# Patient Record
Sex: MALE | Race: WHITE | NOT HISPANIC OR LATINO | Employment: OTHER | URBAN - METROPOLITAN AREA
[De-identification: names, ages, dates, MRNs, and addresses within clinical notes are randomized per-mention and may not be internally consistent; named-entity substitution may affect disease eponyms.]

---

## 2023-12-20 DIAGNOSIS — G45.9 TIA (TRANSIENT ISCHEMIC ATTACK): Primary | ICD-10-CM

## 2024-03-06 ENCOUNTER — OFFICE VISIT (OUTPATIENT)
Dept: PRIMARY CARE CLINIC | Facility: CLINIC | Age: 79
End: 2024-03-06
Payer: MEDICARE

## 2024-03-06 VITALS
HEIGHT: 60 IN | HEART RATE: 71 BPM | TEMPERATURE: 98 F | BODY MASS INDEX: 29.35 KG/M2 | WEIGHT: 149.5 LBS | OXYGEN SATURATION: 99 % | DIASTOLIC BLOOD PRESSURE: 64 MMHG | SYSTOLIC BLOOD PRESSURE: 147 MMHG

## 2024-03-06 DIAGNOSIS — R46.0 POOR PERSONAL HYGIENE: ICD-10-CM

## 2024-03-06 DIAGNOSIS — R30.0 DYSURIA: Primary | ICD-10-CM

## 2024-03-06 DIAGNOSIS — F02.B11 MODERATE LATE ONSET ALZHEIMER'S DEMENTIA WITH AGITATION: ICD-10-CM

## 2024-03-06 DIAGNOSIS — R15.9 ENCOPRESIS: ICD-10-CM

## 2024-03-06 DIAGNOSIS — Z97.3 WEARS GLASSES: ICD-10-CM

## 2024-03-06 DIAGNOSIS — I10 PRIMARY HYPERTENSION: ICD-10-CM

## 2024-03-06 DIAGNOSIS — G30.1 MODERATE LATE ONSET ALZHEIMER'S DEMENTIA WITH AGITATION: ICD-10-CM

## 2024-03-06 DIAGNOSIS — S40.021A CONTUSION OF BOTH UPPER EXTREMITIES, INITIAL ENCOUNTER: ICD-10-CM

## 2024-03-06 DIAGNOSIS — H91.10 PRESBYCUSIS, UNSPECIFIED LATERALITY: ICD-10-CM

## 2024-03-06 DIAGNOSIS — S40.022A CONTUSION OF BOTH UPPER EXTREMITIES, INITIAL ENCOUNTER: ICD-10-CM

## 2024-03-06 LAB
BILIRUB SERPL-MCNC: NEGATIVE MG/DL
BLOOD URINE, POC: NORMAL
COLOR, POC UA: YELLOW
GLUCOSE UR QL STRIP: NEGATIVE
KETONES UR QL STRIP: NEGATIVE
LEUKOCYTE ESTERASE URINE, POC: NORMAL
NITRITE, POC UA: NEGATIVE
PH, POC UA: 5.5
PROTEIN, POC: NEGATIVE
SPECIFIC GRAVITY, POC UA: 1 .2
UROBILINOGEN, POC UA: NORMAL

## 2024-03-06 PROCEDURE — 81001 URINALYSIS AUTO W/SCOPE: CPT | Mod: S$GLB,,, | Performed by: HOSPITALIST

## 2024-03-06 PROCEDURE — 3077F SYST BP >= 140 MM HG: CPT | Mod: CPTII,S$GLB,, | Performed by: HOSPITALIST

## 2024-03-06 PROCEDURE — 99999 PR PBB SHADOW E&M-EST. PATIENT-LVL V: CPT | Mod: PBBFAC,,, | Performed by: HOSPITALIST

## 2024-03-06 PROCEDURE — 99203 OFFICE O/P NEW LOW 30 MIN: CPT | Mod: S$GLB,,, | Performed by: HOSPITALIST

## 2024-03-06 PROCEDURE — 1159F MED LIST DOCD IN RCRD: CPT | Mod: CPTII,S$GLB,, | Performed by: HOSPITALIST

## 2024-03-06 PROCEDURE — 1101F PT FALLS ASSESS-DOCD LE1/YR: CPT | Mod: CPTII,S$GLB,, | Performed by: HOSPITALIST

## 2024-03-06 PROCEDURE — 87205 SMEAR GRAM STAIN: CPT | Performed by: HOSPITALIST

## 2024-03-06 PROCEDURE — 1126F AMNT PAIN NOTED NONE PRSNT: CPT | Mod: CPTII,S$GLB,, | Performed by: HOSPITALIST

## 2024-03-06 PROCEDURE — 3288F FALL RISK ASSESSMENT DOCD: CPT | Mod: CPTII,S$GLB,, | Performed by: HOSPITALIST

## 2024-03-06 PROCEDURE — 3078F DIAST BP <80 MM HG: CPT | Mod: CPTII,S$GLB,, | Performed by: HOSPITALIST

## 2024-03-06 RX ORDER — OLMESARTAN MEDOXOMIL 20 MG/1
20 TABLET ORAL DAILY
Qty: 90 TABLET | Refills: 3 | Status: SHIPPED | OUTPATIENT
Start: 2024-03-06 | End: 2025-03-06

## 2024-03-06 RX ORDER — TAMSULOSIN HYDROCHLORIDE 0.4 MG/1
0.4 CAPSULE ORAL DAILY
Qty: 30 CAPSULE | Refills: 11 | Status: SHIPPED | OUTPATIENT
Start: 2024-03-06 | End: 2025-03-06

## 2024-03-06 RX ORDER — LORAZEPAM 0.5 MG/1
0.5 TABLET ORAL EVERY 12 HOURS PRN
Qty: 30 TABLET | Refills: 0 | Status: SHIPPED | OUTPATIENT
Start: 2024-03-06 | End: 2024-03-20
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
## 2024-03-07 LAB
GRAM STN SPEC: NORMAL
GRAM STN SPEC: NORMAL

## 2024-03-10 PROBLEM — G30.1 MODERATE LATE ONSET ALZHEIMER'S DEMENTIA WITH AGITATION: Chronic | Status: ACTIVE | Noted: 2023-07-20

## 2024-03-10 PROBLEM — R15.9 ENCOPRESIS: Status: ACTIVE | Noted: 2024-03-10

## 2024-03-10 PROBLEM — S40.021A BRUISE OF BOTH ARMS: Status: ACTIVE | Noted: 2024-01-12

## 2024-03-10 PROBLEM — H91.10 PRESBYCUSIS: Status: ACTIVE | Noted: 2024-03-10

## 2024-03-10 PROBLEM — F03.90 DEMENTIA: Status: ACTIVE | Noted: 2023-07-20

## 2024-03-10 PROBLEM — R46.0 POOR PERSONAL HYGIENE: Status: ACTIVE | Noted: 2024-03-10

## 2024-03-10 PROBLEM — F02.B11 MODERATE LATE ONSET ALZHEIMER'S DEMENTIA WITH AGITATION: Chronic | Status: ACTIVE | Noted: 2023-07-20

## 2024-03-10 PROBLEM — I10 HYPERTENSION: Status: ACTIVE | Noted: 2024-01-16

## 2024-03-10 PROBLEM — Z97.3 WEARS GLASSES: Status: ACTIVE | Noted: 2024-03-10

## 2024-03-10 PROBLEM — S40.022A BRUISE OF BOTH ARMS: Status: ACTIVE | Noted: 2024-01-12

## 2024-03-10 PROBLEM — R30.0 DYSURIA: Status: ACTIVE | Noted: 2024-03-10

## 2024-03-10 NOTE — ASSESSMENT & PLAN NOTE
Mitzy notes significant hearing loss, likely representing presbycusis.  Referrals to audiology and ENT placed.
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
## 2024-03-10 NOTE — ASSESSMENT & PLAN NOTE
Patient having involuntary episodes of fecal incontinence, likely due to advanced cognitive dysfunction.  He is aware when it happens and tries to cover up the evidenced by flushing his underwear down the toilet, which jeopardizes his plumbing.  They are unfortunately is little that can be done for this other than being more prepared by wearing disposable briefs, and disposing of them properly.

## 2024-03-10 NOTE — ASSESSMENT & PLAN NOTE
Urinary frequency, urgency, and nocturia seem most consistent with BPH, but we will get urine studies to rule out infection.  Flomax 0.4 mg prescribed.

## 2024-03-10 NOTE — ASSESSMENT & PLAN NOTE
Mitzy is concerned about patient possibly falling when they are not looking, but his gait, strength, and endurance appears normal.  Based on the location of the bruises on his forearms, it is likely he is inadvertently hitting things as he walks past them.

## 2024-03-10 NOTE — ASSESSMENT & PLAN NOTE
He wears glasses but has not had an eye exam in many years as far as we can tell.  Optometry referral for routine eye exam placed.

## 2024-03-10 NOTE — ASSESSMENT & PLAN NOTE
Patient requires additional supervision and prompting to maintain personal hygiene, which is being further facilitated by family now staying with him at home.

## 2024-03-18 ENCOUNTER — PATIENT MESSAGE (OUTPATIENT)
Dept: PRIMARY CARE CLINIC | Facility: CLINIC | Age: 79
End: 2024-03-18
Payer: MEDICARE

## 2024-03-18 ENCOUNTER — TELEPHONE (OUTPATIENT)
Dept: PRIMARY CARE CLINIC | Facility: CLINIC | Age: 79
End: 2024-03-18
Payer: MEDICARE

## 2024-03-18 DIAGNOSIS — G30.1 MODERATE LATE ONSET ALZHEIMER'S DEMENTIA WITH AGITATION: Primary | Chronic | ICD-10-CM

## 2024-03-18 DIAGNOSIS — F02.B11 MODERATE LATE ONSET ALZHEIMER'S DEMENTIA WITH AGITATION: Primary | Chronic | ICD-10-CM
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
## 2024-03-18 NOTE — LETTER
March 27, 2024    Howard Rossi  [address] [facility] [address]             [facility]  [address]  [address]  [facility] [address]  Phone: [phone]  Fax: [phone] To Whom it May Concern,    This letter is intended to serve as a standing physician order placing a partial restriction on on this patient's driving privileges.  On the basis of his moderate cognitive impairment, it is my professional recommendation that for this patient's safety he should only be allowed to drive with direct supervision by a family member or other designated caretaker, pending psychometric evaluation of the extent of his cognitive impairment.  Please feel free to contact me for any further questions or concerns regarding this matter.    Sincerely,        Rosendo Brown MD  [facility]

## 2024-03-19 ENCOUNTER — OFFICE VISIT (OUTPATIENT)
Dept: PRIMARY CARE CLINIC | Facility: CLINIC | Age: 79
End: 2024-03-19
Payer: MEDICARE

## 2024-03-19 DIAGNOSIS — G30.1 MODERATE LATE ONSET ALZHEIMER'S DEMENTIA WITH AGITATION: Chronic | ICD-10-CM

## 2024-03-19 DIAGNOSIS — R44.3 HALLUCINATIONS: Primary | ICD-10-CM

## 2024-03-19 DIAGNOSIS — R30.0 DYSURIA: ICD-10-CM

## 2024-03-19 DIAGNOSIS — R46.0 POOR PERSONAL HYGIENE: ICD-10-CM

## 2024-03-19 DIAGNOSIS — F02.B11 MODERATE LATE ONSET ALZHEIMER'S DEMENTIA WITH AGITATION: Chronic | ICD-10-CM

## 2024-03-19 PROCEDURE — 99499 UNLISTED E&M SERVICE: CPT | Mod: 95,,, | Performed by: HOSPITALIST
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
## 2024-03-20 PROBLEM — R44.3 HALLUCINATIONS: Status: ACTIVE | Noted: 2024-03-20

## 2024-03-20 NOTE — ASSESSMENT & PLAN NOTE
Discussed with Mitzy that the Ativan was not intended to be used on a daily basis or even more than once a day, and is likely why he is now having more frequent and vivid hallucinations.  I advised stopping this medication.  I agree with her decision to hide his keys to prevent him from driving presently.  That issue can be readdressed pending how his mental status changes after stopping the Ativan.

## 2024-03-20 NOTE — ASSESSMENT & PLAN NOTE
LUTS significantly improved after initiation of Flomax.  Urine studies done at prior clinic visit negative for signs of infection or other irritative causes.
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
## 2024-03-20 NOTE — ASSESSMENT & PLAN NOTE
Unable to completely address this prior to termination of visit; caregivers will need to develop a system of reminding him of hygiene tasks without seeming confrontational.

## 2024-03-21 RX ORDER — RISPERIDONE 0.5 MG/1
0.5 TABLET ORAL NIGHTLY
Qty: 30 TABLET | Refills: 11 | Status: SHIPPED | OUTPATIENT
Start: 2024-03-21 | End: 2024-04-30
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
## 2024-04-03 ENCOUNTER — OFFICE VISIT (OUTPATIENT)
Dept: NEUROLOGY | Facility: CLINIC | Age: 79
End: 2024-04-03
Payer: MEDICARE

## 2024-04-03 DIAGNOSIS — F02.B11 MODERATE LATE ONSET ALZHEIMER'S DEMENTIA WITH AGITATION: Primary | Chronic | ICD-10-CM

## 2024-04-03 DIAGNOSIS — G30.1 MODERATE LATE ONSET ALZHEIMER'S DEMENTIA WITH AGITATION: Primary | Chronic | ICD-10-CM

## 2024-04-03 PROCEDURE — 99499 UNLISTED E&M SERVICE: CPT | Mod: 95,,, | Performed by: CLINICAL NEUROPSYCHOLOGIST

## 2024-04-03 PROCEDURE — 96116 NUBHVL XM PHYS/QHP 1ST HR: CPT | Mod: 95,,, | Performed by: CLINICAL NEUROPSYCHOLOGIST

## 2024-04-04 ENCOUNTER — OFFICE VISIT (OUTPATIENT)
Facility: CLINIC | Age: 79
End: 2024-04-04
Payer: MEDICARE

## 2024-04-04 DIAGNOSIS — F02.B11 MODERATE LATE ONSET ALZHEIMER'S DEMENTIA WITH AGITATION: Primary | Chronic | ICD-10-CM

## 2024-04-04 DIAGNOSIS — G30.1 MODERATE LATE ONSET ALZHEIMER'S DEMENTIA WITH AGITATION: Primary | Chronic | ICD-10-CM

## 2024-04-04 PROCEDURE — 99499 UNLISTED E&M SERVICE: CPT | Mod: 95,,, | Performed by: HOSPITALIST
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
## 2024-04-10 ENCOUNTER — OUTPATIENT CASE MANAGEMENT (OUTPATIENT)
Dept: NEUROLOGY | Facility: CLINIC | Age: 79
End: 2024-04-10
Payer: MEDICARE
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
## 2024-04-10 NOTE — PROGRESS NOTES
Social Work - Dementia Care Management:    Referral received today from Dr. Kang to assess care management needs. Phoned caregiver, son's girlfriend Mitzy, left voice mail offering to schedule telehealth consultation.     ADDENDUM  4/11/24:  Received return voice mail from Mitzy, who suggested I contact pt's son Behzad. Phoned Behzad; scheduled phone consultation for 4/12/24, 10:30 AM. Son consented to call being observed by trainees.

## 2024-04-12 ENCOUNTER — OUTPATIENT CASE MANAGEMENT (OUTPATIENT)
Dept: NEUROLOGY | Facility: CLINIC | Age: 79
End: 2024-04-12
Payer: MEDICARE

## 2024-04-12 NOTE — PROGRESS NOTES
"Social Work - Dementia Care Management:    Phone consultation with caregiver, son Behzad. He reported the following:  Pt lives in his own home  Behzad and Behzad's girlfriend Mitzy have been staying at the house to assist with pt's care  Pt's other son Bryant reportedly has Power of  for medical and financial; however there have been some problems with the document being declined by some entities, not having proper "seal"; pays pt's bills online  [place], has a dtr Whitley in [place]  Bryant and Whitley have been pushing for pt to be placed in a nursing home  Behzad hopes to have a family meeting this weekend  Pt has a girlfriend in [place], but Behzad concerned that pt provides significant financial support to her; her calls to pt seem to trigger him; Behzad blocked the gf on pt's phone, but she then showed up at the house  Pt triggered by not being able to drive; they have been parking the car down the street out of pt's sight  Activities/engagement: pt plays cards, reads, watches tv; Behzad and Mitzy try to get him outside, would like him to get more connected; pt has been resistant to Adult Day Program, saying he doesn't need "babysitting;" he might be more receptive if relayed as a doctor's instructions  Pt is "sneaky," Behzad noted they need an alarm on back door  Pt takes [facility] rec'd by one of his doctors    Intervention/Plan:  Provided psycho-education, support, resource information  Discussed benefit of Adult Day Program, and strategies to engage pt's cooperation  Discussed family strain regarding plan of care  Will follow up pending family meeting and clinic visit      "

## 2024-04-24 ENCOUNTER — OFFICE VISIT (OUTPATIENT)
Dept: NEUROLOGY | Facility: CLINIC | Age: 79
End: 2024-04-24
Payer: MEDICARE

## 2024-04-24 ENCOUNTER — OUTPATIENT CASE MANAGEMENT (OUTPATIENT)
Dept: NEUROLOGY | Facility: CLINIC | Age: 79
End: 2024-04-24

## 2024-04-24 DIAGNOSIS — F02.B11 MODERATE LATE ONSET ALZHEIMER'S DEMENTIA WITH AGITATION: Primary | Chronic | ICD-10-CM

## 2024-04-24 DIAGNOSIS — G30.1 MODERATE LATE ONSET ALZHEIMER'S DEMENTIA WITH AGITATION: Primary | Chronic | ICD-10-CM

## 2024-04-24 PROCEDURE — 99499 UNLISTED E&M SERVICE: CPT | Mod: S$GLB,,, | Performed by: CLINICAL NEUROPSYCHOLOGIST

## 2024-04-24 PROCEDURE — 96138 PSYCL/NRPSYC TECH 1ST: CPT | Mod: S$GLB,,, | Performed by: CLINICAL NEUROPSYCHOLOGIST

## 2024-04-24 PROCEDURE — 96133 NRPSYC TST EVAL PHYS/QHP EA: CPT | Mod: S$GLB,,, | Performed by: CLINICAL NEUROPSYCHOLOGIST

## 2024-04-24 PROCEDURE — 96139 PSYCL/NRPSYC TST TECH EA: CPT | Mod: S$GLB,,, | Performed by: CLINICAL NEUROPSYCHOLOGIST

## 2024-04-24 PROCEDURE — 96132 NRPSYC TST EVAL PHYS/QHP 1ST: CPT | Mod: S$GLB,,, | Performed by: CLINICAL NEUROPSYCHOLOGIST

## 2024-04-24 NOTE — PROGRESS NOTES
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 SS = standard score (mean = 100, SD = 15); Tscore mean = 50, SD = 10; zscore (mean = 0.00, SD = 1); WNL = within normal limits; BNL = below normal limits       Billing  Code Description Minutes Units   39788 Psychiatric Interview 0    52105 Nubhvl xm phys/qhp 1st hr 0    43606 Nubhvl xm phy/qhp ea addl hr 0    72669 Psycl tst eval phys/qhp 1st 0    47708 Psycl tst eval phys/qhp ea 0    48291 Nrpsyc tst eval phys/qhp 1st 60 1   44770 Nrpsyc tst eval phys/qhp ea 157 3     Referral review/test selection 30      Tech consult/test review/modifications 10      Patient limitation management 0      Patient behavior management 0      Patient symptom monitoring 0      Record Review/Integration/Report Generation 127      Face-to-Face interpretive Feedback 50    71280 Psycl/nrpsyc tst phy/qhp 1st 0    00505 Psycl/nrpsyc tst phy/qhp ea 0    21790 Psycl/nrpsyc tech 1st 30 1   20956 Psycl/nrpsyc tst tech ea 125 4

## 2024-04-24 NOTE — PROGRESS NOTES
"Social Work - Dementia Care Management:    Family meeting at clinic with sons Behzad and Bryant, and daughter Whitley. They reported the following:  Dtr is worried about pt's loss of independence, losing autonomy  Bryant thinks pt has been driving a little, although Bryant's wife who is a doctor has stated pt should not be driving  Bryant manages most of pt's bills/finances; pt had still been collecting rent on his properties by hand, Bryant is switching over to [employer]; pt still wants to open mail and pay bills, but gets confused  Pt has a "girlfriend" who regularly asked pt for money  Pt is resistant to bathing    Intervention/Plan:  Provided psycho-education, support, strategies  Advised evaluation today with Dr. Kang would likely provide further guidance on driving  Provided education on risks of driving with dementia  Discussed strategies to limit pt's access to mail, especially with potential to get scammed, make mistakes, etc  Discussed options for boundaries to protect pt from "girlfriend" getting his money  Discussed importance of activities and engagement, option of adult day program, brainstormed transportation options  Gave each a copy of 36-Hour Day, and gave [place] Someone Who Has Dementia to dtr  Will email info/resources to Behzad  Can schedule a follow-up family meeting if needed after they received feedback from Dr. Kang      "

## 2024-04-26 ENCOUNTER — PATIENT MESSAGE (OUTPATIENT)
Dept: NEUROLOGY | Facility: CLINIC | Age: 79
End: 2024-04-26
Payer: MEDICARE

## 2024-04-26 NOTE — PATIENT INSTRUCTIONS
"Behavioral Management Strategies:  Remember that you cannot reason with acute psychosis or confusion. Unless there is an immediate safety issue, there is no need to challenge or correct the person.  For example, if a pt is hallucinating, do not argue with the person that what they are seeing is not real. If they are expressing paranoia, empathize gently with their fear, and attempt to redirect them to a different activity.   If the person is particularly stuck on a topic or activity, as long as the activity is safe and is not worsening their agitation, you don't need to intervene.   Communicate calmly, simply, and concisely  Reassure the person that they are safe and you are here to help  Try not to express irritation or anger  Speak quietly and calmly, do not shout or threaten the person. Avoid provocation.   Establish verbal contact and provide orientation and reassurance.  Attempt to identify the patient's wants and feelings, listen to what they are saying, and reflect those wants and feelings back to the patient.  Don't use sarcasm as a weapon    Set clear limits on behavior in a calm voice ("You cannot hit the nurse.")  Offer choices and optimism ("Which toothpaste would you like to use today?")  Attempt to redirect the person to an alternative behavior ("Can you come help me with this?)  Avoid saying things like, "We already went over this!" "You can't keep doing this." "I already explained this to you"  Instead, simply nod calmly and acknowledge what the person is saying ("Yes, that makes sense."), and then request their help or company in a different behavior.   Having a mental list of activities the patient enjoys can be helpful with redirection. For example, do they enjoy going for walks? Eating a piece of candy?   Sometimes activities which are repetitive can be calming, particularly if there is a comforting sensory element. For example, pt may enjoy folding soft towels or laundry.  Limit " "overstimulation  Decrease distractions by turning off the TV, computer, any fluorescent lights that hum, etc.  Ask any casual visitors to leave--the fewer people the better  If the person is very agitated, avoid touching them, and respect their personal space  Sit down and ask the person to sit down also     Preventative strategies:  Try to help the patient develop a routine and stick to it  Address all immediate safety issues  Does the person still have access to the car and keys? Take the keys away, or disconnect the car battery.  Are they wandering at night? Install locks on the outside doors. A keypad lock works well. Alarms on bedroom doors can also be helpful.   Are they turning on the stove and risking a fire? If you cannot limit their access to the kitchen, you may need to unplug dangerous devices any time you are not in the room.   If the person is exhibiting poor judgment throughout the day, they likely need someone supervising them at all times.   Do they still have access to significant financial assets? Limit their access to accounts, and consult with a  if necessary.   Identify situations that seem to trigger agitation or a problematic behavior, and modify the person's environment to minimize or eliminate that trigger.   For example, is their behavior worse if they don't get a good night's sleep? Or if they don't eat or drink enough? If so, prioritize those activities and build behavior plans to support them.  Do they get upset about not being allowed to answer the phone when it rings? Put all of the phones in the house on "silent."   Do they become paranoid that certain family members are trying to take advantage of them? Limit contact with the targeted family member as much as possible, and re-introduce them slowly after some time has passed.   Encourage independence in daily living whenever safely possible  Encourage collaborative decision-making regarding his care as well as daily " activities  Encourage regular physical exercise  Address issues that may be impacting sleep   Ex: Urology consult for frequent nighttime urination, address chronic pain that may be interfering with sleep, moving to a different room if his roommate snores loudly, make sure the room is a comfortable temperature and he has adequate pillows and blankets  Ensure he gets out into the sunlight at least once per day to encourage regular circadian cycle  No caffeine, sugar, or large meals within two hours of bedtime   Make sure room at night is dark and quiet and minimize nighttime interruptions from staff unless medically necessary   Try to help pt go to sleep and wake up at the same time every day  Monitor closely for worsening psychiatric symptoms (insomnia, social withdrawal, deterioration of personal hygiene, hostility, confusing or nonsensical speech, paranoia, hallucinations) and intervene promptly. Assess for possible antecedents, modify environment appropriately.      Sleep Hygiene: Poor sleep has a negative effect on cognition. Several strategies have been shown to improve sleep:   Caffeine intake in the afternoon and evening, as well as stuffing oneself at supper, can decrease the quality of restful sleep throughout the night.   Bedtime and wake-up times should be consistent every night and morning so the body becomes used to a single routine, even on the weekends.  Engage in daily physical activity, but not 2-3 hours before bedtime.   No technology use (television, computer, iPad) 1-2 hours before bed.   Have a wind down routine (e.g., soft lights in the house, bath before bed, reduced fluid intake, songs, reading, less noise) to promote sleep readiness.   Visit the www.sleepfoundation.org for more strategies.      Practice good cognitive/brain health hygiene:  Engage in regular exercise, which increases alertness and arousal and can improve attention and focus.  Consider lower impact exercises, such as yoga or  light walking.  Get a good night's sleep, as this can enhance alertness and cognition.  Eat healthy foods and balanced meals. It is notable that research indicates certain nutrients may aid in brain function, such as B vitamins (especially B6, B12, and folic acid), antioxidants (such as vitamins C and E, and beta carotene), and Omega-3 fatty acids. Talk with your physician or nutritionist about what's right for you.   Keep your brain active. Find activities to stay mentally active, such as reading, games (cards, checkers), puzzles (crosswords, Sudoku, jig saw), crafts (models, woodworking), gardening, or participating in activities in the community.  Stay socially engaged. Continue staying active with your family and friends.

## 2024-04-29 ENCOUNTER — TELEPHONE (OUTPATIENT)
Dept: NEUROLOGY | Facility: CLINIC | Age: 79
End: 2024-04-29
Payer: MEDICARE

## 2024-04-29 ENCOUNTER — TELEPHONE (OUTPATIENT)
Dept: PRIMARY CARE CLINIC | Facility: CLINIC | Age: 79
End: 2024-04-29

## 2024-04-29 ENCOUNTER — OFFICE VISIT (OUTPATIENT)
Facility: CLINIC | Age: 79
End: 2024-04-29
Payer: MEDICARE

## 2024-04-29 VITALS
HEART RATE: 73 BPM | BODY MASS INDEX: 30.38 KG/M2 | DIASTOLIC BLOOD PRESSURE: 70 MMHG | WEIGHT: 154.75 LBS | OXYGEN SATURATION: 98 % | SYSTOLIC BLOOD PRESSURE: 142 MMHG | HEIGHT: 60 IN

## 2024-04-29 DIAGNOSIS — F02.B11 MODERATE LATE ONSET ALZHEIMER'S DEMENTIA WITH AGITATION: Chronic | ICD-10-CM

## 2024-04-29 DIAGNOSIS — N40.1 BENIGN PROSTATIC HYPERPLASIA WITH NOCTURIA: ICD-10-CM

## 2024-04-29 DIAGNOSIS — G30.1 MODERATE LATE ONSET ALZHEIMER'S DEMENTIA WITH AGITATION: Chronic | ICD-10-CM

## 2024-04-29 DIAGNOSIS — I10 PRIMARY HYPERTENSION: Primary | ICD-10-CM

## 2024-04-29 DIAGNOSIS — R35.1 BENIGN PROSTATIC HYPERPLASIA WITH NOCTURIA: ICD-10-CM

## 2024-04-29 PROCEDURE — 3078F DIAST BP <80 MM HG: CPT | Mod: CPTII,S$GLB,, | Performed by: HOSPITALIST

## 2024-04-29 PROCEDURE — 1159F MED LIST DOCD IN RCRD: CPT | Mod: CPTII,S$GLB,, | Performed by: HOSPITALIST

## 2024-04-29 PROCEDURE — 1101F PT FALLS ASSESS-DOCD LE1/YR: CPT | Mod: CPTII,S$GLB,, | Performed by: HOSPITALIST

## 2024-04-29 PROCEDURE — 3077F SYST BP >= 140 MM HG: CPT | Mod: CPTII,S$GLB,, | Performed by: HOSPITALIST

## 2024-04-29 PROCEDURE — 1126F AMNT PAIN NOTED NONE PRSNT: CPT | Mod: CPTII,S$GLB,, | Performed by: HOSPITALIST

## 2024-04-29 PROCEDURE — 99999 PR PBB SHADOW E&M-EST. PATIENT-LVL III: CPT | Mod: PBBFAC,,, | Performed by: HOSPITALIST

## 2024-04-29 PROCEDURE — 99214 OFFICE O/P EST MOD 30 MIN: CPT | Mod: S$GLB,,, | Performed by: HOSPITALIST

## 2024-04-29 PROCEDURE — 3288F FALL RISK ASSESSMENT DOCD: CPT | Mod: CPTII,S$GLB,, | Performed by: HOSPITALIST

## 2024-04-29 NOTE — ASSESSMENT & PLAN NOTE
Recently completed neuropsychological testing showing moderate cognitive impairment in both memory and executive function.  Over the past couple of months he has shown remarkable improvement in his self-care skills, orientation, and mood thanks to the personal attention shown to him by Mitzy.  He is thriving with a stable environment and regular routine, and disruptions in such have shown deleterious effects on his mental status, which medications targeted at behavioral symptoms seem to have made worse.  He is currently doing well without any psychoactive medication.  The ongoing conflict between his children regarding nursing home nursing home placement continues to have an adverse psychological impact on him and so far has remained at an impasse despite open group communication through a  as well as an .  I think the best path forward is to participate personally in a group discussion between his 3 children, Mitzy, and the patient himself so that some mutual understanding can be reached.  I intend to reach out to his other son and daughter today individually to try to calm the situation down pending a group discussion.  As the neuropsychology report recommends revisiting the use of Namenda or Aricept we discussed again the limited evidence showing some improvement on cognitive testing scores but no change in nursing home placement as an endpoint, and that these medications can have potentially serious side effects with their strong anticholinergic properties.  Considering the paradoxical response he has had to risperidone and lorazepam already I am hesitant to recommend trying this at this time.  He already has a referral placed to [facility].

## 2024-04-29 NOTE — PROGRESS NOTES
"Primary Care Provider Appointment - [facility]  Rosendo Brown MD    Subjective:      Patient ID: Howard Rossi is a 78 y.o. male with   Past Medical History:   Diagnosis Date    Hypertension 01/16/2024    Moderate late onset Alzheimer's dementia with agitation 07/20/2023           Chief Complaint: Hypertension    Prior to this visit, patient's last encounter with PCP was 4/4/2024.    Pt presents w/ Mitzy today.  She reports having trouble w/ pt's other 2 children Bryant and Sadaf still trying to get him into a NH on the [place].  Bryant's wife (Ana Maria Michael, who has been placing orders in his chart) is a family practice physician and set him up with an appointment with a neurologist friend of hers for today to "diagnose" him with dementia and force him into a NH.  He refuses to leave his house, and threatened to take a cab and leave in secret if he was going to be forced to do that.  Mitzy moved up patient's scheduled appointment for tomorrow to this morning to discuss what to do.  He continues to do well at home with Mitzy and Behzad.  She has put up a dry erase board with orienting information and he has a to-do list of tasks for the day including personal hygiene, errands, etc.  He is shaving himself now!  Bryant is still trying to execute the un-notarized POA form which the de la rosa aren't honoring but his credit cards have all been canceled and Bryant has taken over collecting rent from the renal properties.  The other 2 kids have been hostile to Mitzy and threatening legal action if Mitzy and Behzad don't comply with their plan.  They (Behzad and Mitzy) have spoken with a laywer about this and have a PI investigating the legitimacy of the POA form.  Mitzy notes that the structure and diction of the form closely resembles a stock form from [employer], which she recognizes due to having used the service for her own HCPOA.  Sadaf took his car and it is at her house, but she " keeps giving him different excuses when he asks where it is.      4/4: Virtual visit conducted with both patient and caregiver Mitzy.  She notes remarkable improvement over the past few days after getting into a good daily routine with him.  Was frequently upset and depressed about being stuck at home, but after taking him on daily outings either on foot or by car his behavior and mood have improved considerably.  He is no longer calling her Riddhi intermittently like he was before (the name of an old girlfriend from a few decades ago).  He has not been having hallucinations.  She has been engaging him in a sort of reorientation therapy by reminding him of the date and time, and she has noted significant improvement in his overall condition with that.  He is retaining awareness of the date throughout the day, along with seemingly improved short-term memory.  They were very pleased with the visit with the neuropsychologist, who has arranged for a family meeting with all of the children and 1 of the social workers, as well as setting them up with [facility]-like resources as enrollment for the trial has closed.  His oldest son Bryant is claiming to have power of , which patient has no recollection of signing and his bank is refusing to acknowledge due to it not being notorized.  Bryant is attempting to assume complete financial control over patient's finances and rental income, and he along with patient's daughter are trying to make arrangements to move him into a nursing home on the [place], which the patient absolutely refuses to do, and none of this was being communicated to Behzad or Mitzy, who were just as surprised as the patient was upon finding this out.  Over the past several days patient has been eating better, and keeping up with his hygiene with minimal prompting.  He is bathing more regularly and Mitzy is helping him shave, which she used to do for her own father when she was caring for  him for his dementia.  Mitzy also notes that a few days previously the woman who had been financially exploiting him came to visit with her daughter, and after they had asked her to give them some privacy, Mitzy later caught the daughter rummaging through drawers and cabinets and when asked what she was looking for, she played it off innocuously but suspiciously ended her search.     3/19: Pt reports no complaints.  Mitzy, who has been living with pt along with his son Behzad report increasing agitation and hallucinations.  He has been seeing and hearing people who aren't there; talking to his  mother, and even watching full sports games in his backyard from 4:30-5 in the evening until dusk.  He's able to vividly describe the jerseys and pants the players are wearing, and keeps score (?!) throughout the game.  Pt was staying with his other son Bryant for a few days, and Mitzy suspects that there is a large degree of inter-observer variability in describing his behavior.  The Ativan prescribed at last visit has been administered to him 2 to 3 times a day and they are already out.  They seemed to notice a considerable improvement in his agitation, but the hallucinations are more frequent and vivid than ever.  He missed his in-person appointment because as they were about to leave to come in, the woman who had been using his credit cards showed up wanting to take him to lunch.  His cards had all been canceled and flagged for fraud, but auto drafts for utilities not associated with his rental properties are still hitting his account, which they have come to realize is this woman's home address.  Mitzy intervened and did not allow patient to go with the woman.  They have discovered that she is a  and patient has been giving her money and letting her use his credit cards and bank account to pay for her bills.  She noticed last week when he went on his regular route to  rent from his  rental properties he came home empty-handed when he normally has the rent money.  His daughter, who is also in healthcare, has been tracking his location when he drives through a phone jake, and noticed that he was way off of his usual route, and she went to find him at the bar where this woman works.  She brought him home in his vehicle and her significant other drove her car home.  He was extremely upset about this for awhile but then forgot about it.  She had his cell phone forwarded to hers, and has noted that this woman calls all through the night.  Since he started hallucinating Mitzy has hidden his keys, and he is frequently frustrated about not being able to find them.  She has also been able to reduce his coffee consumption by keeping his coffee can nearly empty, so he rations his coffee and drinks fewer cups.  She has also been mixing the coffee with decaf so that he is not getting so much caffeine.  His usual daily routine is getting up about 530 or 6 to use the bathroom, getting up for good about 7 or so; has lunch between 12-1 and then takes a 90 minute nap, after which she notes his mental status seems to fall off a jonelle, with much more irritability.  They have dinner between 6-7 and he will often watch TV until falling asleep at about 9 or so.  Both Mitzy and Behzad note significant improvement in the urinary frequency since starting Flomax.  He is only having to get up once overnight to urinate.  She also reports that he will often refuse food when presented with it, but if it is set in front of him regardless and they walk off without confrontation he will eat it.  It has been 13 days since they have gotten him to bathe, and his hygiene continues to be a difficult issue.  He refuses help with this and denies any difficulty with ADLs.  They have been contacted by audiology and ENT to schedule their visit, but they have yet to hear from Neuropsychology.    3/6: Pt reports here to establish care  w/ son's (Behzad) PAUL (Mitzy), who is a retired ED nurse.  Pt lives independently and still drives.  Was admitted to [facility] in Jan after being found face down in a park in an ant bed, hospitalized 1/12-1/16 for uti, sepsis, AMS.  Was seeing a general surgeon as PCP for a long time (15-20y) but otherwise has not seen any doctors until this hospitalization.    Recently started having nocturia x 4-5/night.  Has appt upcoming with a urologist, and saw a neurologist several days after hospital d/c and was Rx'd Namenda and Aricept but won't take them.  Takes lisinopril as only medication at night.  Was on 2 BP meds  Drinks several cups of coffee daily, and has been having some anxiety in the evenings.  Personally denies agitation/anxiety but Mitzy says he does.  In the hospital he was saying he wanted to go home, and gives the address of a house he hasn't lived in for many years.  He still cites this address today.  Since hospital d/c Behzad and Mitzy have moved in with him due to other son Bryant not wanting him living alone so pt stayed with him for about a week before insisting on being in his own home.  He had HH for several days but he insisted on driving so was discharged.  Mitzy covertly reports that he has been having fecal incontinence which embarrasses him and he tries flushing his underwear down the toilet.  She reports he also hasn't been keeping up with hygiene very well.  She rides with him when he drives and says he does very well with that; no safety concerns.  He denies getting lost at times.  He denies trouble managing finances, but Mitzy says he does.  They have mostly taken over shopping and cooking for him.  His house was in mild disrepair and disarray when they moved in but have since cleaned up and fixed up everything.    Pt denies any recent falls but Mitzy notes some fresh bruises on RUE.    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    Son and SO discovered someone was using his credit cards, so had to cancel all those.    4Ms for Medical Decision-Making in Older Adults    Last Completed EAWV: None    MOBILITY:  Get Up and Go:       No data to display              Activities of Daily Living:       No data to display              Whisper Test:       No data to display              Disability Status:       No data to display              Nutrition Screening:       No data to display             Screening Score: 0-7 Malnourished, 8-11 At Risk, 12-14 Normal    MENTATION:   Depression Patient Health Questionnaire:       No data to display              Has Dementia Dx: Yes    Cognitive Function Screening:       No data to display              Cognitive Function Screening Total - Less than 4 = Abnormal,  Greater than or equal to 4 = Normal    MEDICATIONS:  High Risk Medications:  Total Active Medications: 1  risperiDONE Tab - 0.5 MG    WHAT MATTERS MOST:  Advance Care Planning   ACP Status:   Patient has had an ACP conversation  Living Will: No  Power of : No  LaPOST: No    ACP discussion deferred today due to time constraints well as patient factors.    Social History     Socioeconomic History    Marital status:    Tobacco Use    Smoking status: Never     Passive exposure: Never    Smokeless tobacco: Never   Substance and Sexual Activity    Alcohol use: Yes     Alcohol/week: 3.0 standard drinks of alcohol     Types: 3 Cans of beer per week    Drug use: Not Currently    Sexual activity: Yes     Partners: Female     Social Determinants of Health     Financial Resource Strain: Low Risk  (4/3/2024)    Overall Financial Resource Strain (CARDIA)     Difficulty of Paying Living Expenses: Not hard at all   Food Insecurity: No Food Insecurity (4/3/2024)    Hunger Vital Sign     Worried About Running Out of Food in the Last Year:  Never true     Ran Out of Food in the Last Year: Never true   Transportation Needs: No Transportation Needs (4/3/2024)    PRAPARE - Transportation     Lack of Transportation (Medical): No     Lack of Transportation (Non-Medical): No   Physical Activity: Inactive (4/3/2024)    Exercise Vital Sign     Days of Exercise per Week: 0 days     Minutes of Exercise per Session: 0 min   Stress: Stress Concern Present (4/3/2024)    [employer] - Occupational Stress Questionnaire     Feeling of Stress : Very much   Social Connections: Unknown (4/3/2024)    Social Connection and Isolation Panel [NHANES]     Frequency of Communication with Friends and Family: More than three times a week     Frequency of Social Gatherings with Friends and Family: Once a week     Active Member of Clubs or Organizations: No     Attends Club or Organization Meetings: Never   Housing Stability: Low Risk  (4/3/2024)    Housing Stability Vital Sign     Unable to Pay for Housing in the Last Year: No     Number of Places Lived in the Last Year: 1     Unstable Housing in the Last Year: No       Review of Systems   Constitutional:  Negative for activity change and appetite change.   HENT:  Positive for nasal congestion, hearing loss and rhinorrhea.    Eyes:  Negative for pain and visual disturbance.   Respiratory:  Negative for cough and shortness of breath.    Cardiovascular:  Positive for chest pain. Negative for palpitations and leg swelling.   Gastrointestinal:  Negative for change in bowel habit, constipation and diarrhea.   Genitourinary:  Negative for bladder incontinence, difficulty urinating, dysuria, enuresis, frequency and urgency.   Musculoskeletal:  Negative for neck pain.   Integumentary:  Negative for rash and wound.   Neurological:  Positive for memory loss. Negative for dizziness, light-headedness and headaches.   Psychiatric/Behavioral:  Negative for agitation, behavioral problems, confusion, dysphoric mood,  "hallucinations and sleep disturbance.         Objective:   BP (!) 142/70 (BP Location: Left arm, Patient Position: Sitting, BP Method: Medium (Automatic))   Pulse 73   Ht 4' 9" (1.448 m)   Wt 70.2 kg (154 lb 12.2 oz)   SpO2 98%   BMI 33.49 kg/m²     Physical Exam  Vitals reviewed.   Constitutional:       General: He is not in acute distress.     Appearance: He is normal weight.      Comments: Appears much better-groomed today than before.  He did miss a few spots on his chin shaving.   HENT:      Head: Normocephalic and atraumatic.      Nose: Nose normal.   Eyes:      General: No scleral icterus.     Conjunctiva/sclera: Conjunctivae normal.   Cardiovascular:      Rate and Rhythm: Normal rate.   Pulmonary:      Effort: Pulmonary effort is normal. No respiratory distress.   Musculoskeletal:         General: No deformity.      Right lower leg: No edema.      Left lower leg: No edema.   Skin:     General: Skin is warm and dry.   Neurological:      Mental Status: He is alert and oriented to person, place, and time.      Cranial Nerves: No cranial nerve deficit.      Comments: Mistakenly recalls recent events as being years in the past.   Psychiatric:         Attention and Perception: Attention and perception normal.         Mood and Affect: Mood and affect normal.         Speech: Speech normal.         Behavior: Behavior is cooperative.         Thought Content: Thought content is not paranoid or delusional.         Cognition and Memory: He exhibits impaired recent memory.      Comments: Mood much more stable today.  No agitation.  Even recalls recent event a few days back and appreciates how silly it seemed, and laughs.            Lab Results   Component Value Date    WBC 9.8 01/14/2024    HGB 14.0 01/14/2024    HCT 43.1 01/14/2024    TSH 0.57 01/13/2024       Current Outpatient Medications on File Prior to Visit   Medication Sig Dispense Refill    olmesartan (BENICAR) 20 MG tablet Take 1 tablet (20 mg total) by " mouth once daily. 90 tablet 3    tamsulosin (FLOMAX) 0.4 mg Cap Take 1 capsule (0.4 mg total) by mouth once daily. 30 capsule 11    cyanocobalamin (VITAMIN B-12) 1000 MCG tablet Take 1 tablet (1,000 mcg total) by mouth once daily. (Patient not taking: Reported on 3/6/2024) 90 tablet 3    risperiDONE (RISPERDAL) 0.5 MG Tab Take 1 tablet (0.5 mg total) by mouth every evening. 30 tablet 11     No current facility-administered medications on file prior to visit.         Assessment:   78 y.o. male with multiple co-morbid illnesses here to follow-up for ongoing chronic disease management and preventive health maintenance.    Plan:     Problem List Items Addressed This Visit       Moderate late onset Alzheimer's dementia with agitation (Chronic)     Recently completed neuropsychological testing showing moderate cognitive impairment in both memory and executive function.  Over the past couple of months he has shown remarkable improvement in his self-care skills, orientation, and mood thanks to the personal attention shown to him by Mitzy.  He is thriving with a stable environment and regular routine, and disruptions in such have shown deleterious effects on his mental status, which medications targeted at behavioral symptoms seem to have made worse.  He is currently doing well without any psychoactive medication.  The ongoing conflict between his children regarding residential nursing home placement continues to have an adverse psychological impact on him and so far has remained at an impasse despite open group communication through a  as well as an .  I think the best path forward is to participate personally in a group discussion between his 3 children, Mitzy, and the patient himself so that some mutual understanding can be reached.  I intend to reach out to his other son and daughter today individually to try to calm the situation down pending a group discussion.  As the neuropsychology report  recommends revisiting the use of Namenda or Aricept we discussed again the limited evidence showing some improvement on cognitive testing scores but no change in nursing home placement as an endpoint, and that these medications can have potentially serious side effects with their strong anticholinergic properties.  Considering the paradoxical response he has had to risperidone and lorazepam already I am hesitant to recommend trying this at this time.  He already has a referral placed to [facility].         Hypertension - Primary     Blood pressure not at goal today, although patient is currently upset about the ongoing conflict between his children.  We will reassess at return visit.         Benign prostatic hyperplasia with nocturia     LUTS resolved with initiation of Flomax.  He has no longer having nocturia, nor is he urinating in odd places like he had been previously.              Health Maintenance         Date Due Completion Date    Hepatitis C Screening Never done ---    Lipid Panel Never done ---    TETANUS VACCINE Never done ---    Shingles Vaccine (1 of 2) Never done ---    RSV Vaccine (Age 60+ and Pregnant patients) (1 - 1-dose 60+ series) Never done ---    Pneumococcal Vaccines (Age 65+) (1 of 1 - PCV) Never done ---    Influenza Vaccine (1) Never done ---    COVID-19 Vaccine (3 - 2023-24 season) 09/01/2023 4/2/2021            Future Appointments   Date Time Provider Department Center   5/13/2024 10:20 AM Rosendo Brown MD [facility]   5/22/2024  1:30 PM Kathleen Nice, NP [facility] ENT [place]   5/22/2024  2:00 PM Yu Strong AU.D [facility] AUDIO [place]   6/19/2024 10:00 AM [facility] PSYCHIATRY, GERIATRIC CLINIC 2 [facility] PSYCH [place]         Follow up in about 2 weeks (around 5/13/2024) for Virtual Visit. Total clinical care time was 60 min.    Rosendo Brown MD  [facility] [employer] [facility] [employer]                    Answers submitted by the patient for this  visit:  High Blood Pressure Questionnaire (Submitted on 4/29/2024)  Chief Complaint: Hypertension  Chronicity: chronic  Onset: more than 1 year ago  Progression since onset: rapidly worsening  Condition status: uncontrolled  anxiety: Yes  blurred vision: No  malaise/fatigue: No  orthopnea: No  peripheral edema: No  PND: No  sweats: No  Agents associated with hypertension: decongestants  CAD risks: family history, sedentary lifestyle, stress  Compliance problems: psychosocial issues  Past treatments: alpha 1 blockers  Improvement on treatment: moderate

## 2024-04-29 NOTE — TELEPHONE ENCOUNTER
Was able to talk with patient's son, Bryant. States that patient was not at home when the patient's daughter arrived to take him to his neurology appointment. Son that he lives with told them he didn't know where patient was. Bryant was going to call the police but saw video footage of son Behzad and Mitzy helping patient into the car. Daughter waited and Mitzy returned to the house with the patient. I informed him the patient had a PCP appointment scheduled for today, which was a newly scheduled appointment. Missed his neurology appointment. Discussed importance of involving attorneys given disagreements in family and the need for all legal documents to be accurate and up to date.

## 2024-04-29 NOTE — TELEPHONE ENCOUNTER
----- Message from Neelima Hines sent at 4/29/2024 11:32 AM CDT -----  Contact: Bryant / Son [phone]  Type: Returning a call    Who left a message? N/a    When did the practice call? Today    Comments:      Please Call and advise    Thank you    Please do NOT rep[ly to sender as this is from the call center and they answer incoming calls only.

## 2024-04-29 NOTE — TELEPHONE ENCOUNTER
Patient's son called but I don't see an involvement of care form filled out for us to speak with his son.

## 2024-04-30 PROBLEM — R35.1 BENIGN PROSTATIC HYPERPLASIA WITH NOCTURIA: Status: ACTIVE | Noted: 2024-03-10

## 2024-04-30 PROBLEM — R44.3 HALLUCINATIONS: Status: RESOLVED | Noted: 2024-03-20 | Resolved: 2024-04-30

## 2024-04-30 PROBLEM — S40.022A BRUISE OF BOTH ARMS: Status: RESOLVED | Noted: 2024-01-12 | Resolved: 2024-04-30

## 2024-04-30 PROBLEM — S40.021A BRUISE OF BOTH ARMS: Status: RESOLVED | Noted: 2024-01-12 | Resolved: 2024-04-30

## 2024-04-30 PROBLEM — N40.1 BENIGN PROSTATIC HYPERPLASIA WITH NOCTURIA: Status: ACTIVE | Noted: 2024-03-10

## 2024-04-30 PROBLEM — R46.0 POOR PERSONAL HYGIENE: Status: RESOLVED | Noted: 2024-03-10 | Resolved: 2024-04-30

## 2024-04-30 NOTE — ASSESSMENT & PLAN NOTE
LUTS resolved with initiation of Flomax.  He has no longer having nocturia, nor is he urinating in odd places like he had been previously.

## 2024-04-30 NOTE — ASSESSMENT & PLAN NOTE
Blood pressure not at goal today, although patient is currently upset about the ongoing conflict between his children.  We will reassess at return visit.

## 2024-04-30 NOTE — TELEPHONE ENCOUNTER
----- Message from Griselda Hernández sent at 4/30/2024  9:23 AM CDT -----  Contact: pt [phone]  Type: Returning a call    Who left a message?Rosendo Brown MD    When did the practice call?today     Please call back pt and advise

## 2024-05-01 ENCOUNTER — OUTPATIENT CASE MANAGEMENT (OUTPATIENT)
Dept: NEUROLOGY | Facility: CLINIC | Age: 79
End: 2024-05-01
Payer: MEDICARE

## 2024-05-01 ENCOUNTER — TELEPHONE (OUTPATIENT)
Facility: CLINIC | Age: 79
End: 2024-05-01
Payer: MEDICARE

## 2024-05-01 NOTE — TELEPHONE ENCOUNTER
Attempted to call patient's son Bryant as well as daughter Whitley to discuss his condition and address any concerns they may have.  Both calls went to voicemail; messages left with both informing of intent to try again later.

## 2024-05-01 NOTE — PROGRESS NOTES
Social Work - Dementia Care Management:    Received voice mail from caregiver, son Behzad's girlfriend Mitzy, asking for call back. Phoned Mitzy but advised I was between appts and had only a few minutes; conversation was continued later in day. She reported the following:  She is a retired nurse and is pt's primary hands-on caregiver  Mitzy has created systems and activities to help keep pt active and oriented  She and Behzad are prepared to keep pt in his own home and care for him as long as possible   Pt sees PCP Dr. Brown on a regular basis  Fmly hoping to support pt's feelings of independence as much as possible  Fmly members not all on same page as to pt's level of impairment and planning for long-term care    Intervention/Plan:  Provided psycho-education, support, strategies  Encouraged Mitzy to provide pt with choices as much as possible (usually either/or, not open-ended)  Assured Mitzy that pt has undergone a thorough neuropsych assessment, the documentation of which provides support/justification for care recommendations  Advised Jeremy of email otis; obtained alternative email address for Behzad, as well as confirming I would send email to Mitzy as well  Have conferred with Dr. Brown

## 2024-05-13 ENCOUNTER — OUTPATIENT CASE MANAGEMENT (OUTPATIENT)
Dept: NEUROLOGY | Facility: CLINIC | Age: 79
End: 2024-05-13
Payer: MEDICARE

## 2024-05-13 ENCOUNTER — OFFICE VISIT (OUTPATIENT)
Facility: CLINIC | Age: 79
End: 2024-05-13
Payer: MEDICARE

## 2024-05-13 DIAGNOSIS — G30.1 MODERATE LATE ONSET ALZHEIMER'S DEMENTIA WITH AGITATION: Primary | Chronic | ICD-10-CM

## 2024-05-13 DIAGNOSIS — F02.B11 MODERATE LATE ONSET ALZHEIMER'S DEMENTIA WITH AGITATION: Primary | Chronic | ICD-10-CM

## 2024-05-13 PROCEDURE — 1157F ADVNC CARE PLAN IN RCRD: CPT | Mod: CPTII,95,, | Performed by: HOSPITALIST

## 2024-05-13 PROCEDURE — 99214 OFFICE O/P EST MOD 30 MIN: CPT | Mod: 95,,, | Performed by: HOSPITALIST

## 2024-05-13 NOTE — PROGRESS NOTES
Social Work - Dementia Care Management:    Voice mail received from marta Wen on 5/7/24.  Sent email today to son at updated email address, (previous email bounced back) and to his girlfriend Mitzy, providing information on Adult Day Programs in the area.  Advised I would call on 5/16/24, 10:00 AM.  Email sent to son bounced back again.

## 2024-05-13 NOTE — PROGRESS NOTES
[facility] Physician Telemedicine Appointment  Rosendo Brown MD      The patient location is:  Patient Home   The chief complaint leading to consultation is: f/u  Total time spent with patient: 57 minutes    Visit type: Virtual visit with synchronous audio only and video  Each patient to whom he or she provides medical services by telemedicine is:  (1) informed of the relationship between the physician and patient and the respective role of any other health care provider with respect to management of the patient; and (2) notified that he or she may decline to receive medical services by telemedicine and may withdraw from such care at any time.      Subjective:      Patient ID: Howard Rossi is a 78 y.o. male.    Chief Complaint: f/u    Prior to this visit, patient's last encounter with PCP was 4/29/2024.    Virtual visit conducted with Mitzy only today; they appeared to be at a restaurant and she stepped outside to speak with me.  She reports ongoing conflict w/ pt's other children regarding POA and they have seen an  themselves to get it revoked.  I also informed her that Bryant's wife Dr. Michael reached out to me to discuss his situation.  She relayed concerns that pt was being exploited, and I relayed my assessments of his living situation with Behzad and Mitzy and that he seemed to be doing better than ever; keeping up with hygiene and having less agitation.  Dr. Michael denied that they were trying to force him into a NH as well as her accessing his chart or placing orders.  Video call suddenly dropped while Mitzy and I were speaking and did not restore.    4/29: Pt presents w/ Mitzy today.  She reports having trouble w/ pt's other 2 children Bryant and Sadaf still trying to get him into a NH on the [place].  Bryant's wife (Ana Maria Michael, who has been placing orders in his chart) is a family practice physician and set him up with an appointment with a neurologist friend of  "hers for today to "diagnose" him with dementia and force him into a NH.  He refuses to leave his house, and threatened to take a cab and leave in secret if he was going to be forced to do that.  Mitzy moved up patient's scheduled appointment for tomorrow to this morning to discuss what to do.  He continues to do well at home with Mitzy and Behzad.  She has put up a dry erase board with orienting information and he has a to-do list of tasks for the day including personal hygiene, errands, etc.  He is shaving himself now!  Bryant is still trying to execute the un-notarized POA form which the de [place] [employer] aren't honoring but his credit cards have all been canceled and Bryant has taken over collecting rent from the renal properties.  The other 2 kids have been hostile to Mitzy and threatening legal action if Mitzy and Behzad don't comply with their plan.  They (Behzad and Mitzy) have spoken with a laywer about this and have a PI investigating the legitimacy of the POA form.  Mitzy notes that the structure and diction of the form closely resembles a stock form from [employer], which she recognizes due to having used the service for her own HCPOA.  Sadaf took his car and it is at her house, but she keeps giving him different excuses when he asks where it is.      4/4: Virtual visit conducted with both patient and caregiver Mitzy.  She notes remarkable improvement over the past few days after getting into a good daily routine with him.  Was frequently upset and depressed about being stuck at home, but after taking him on daily outings either on foot or by car his behavior and mood have improved considerably.  He is no longer calling her Riddhi intermittently like he was before (the name of an old girlfriend from a few decades ago).  He has not been having hallucinations.  She has been engaging him in a sort of reorientation therapy by reminding him of the date and time, and she has noted significant improvement in his " overall condition with that.  He is retaining awareness of the date throughout the day, along with seemingly improved short-term memory.  They were very pleased with the visit with the neuropsychologist, who has arranged for a family meeting with all of the children and 1 of the social workers, as well as setting them up with [facility]-like resources as enrollment for the trial has closed.  His oldest son Bryant is claiming to have power of , which patient has no recollection of signing and his bank is refusing to acknowledge due to it not being notorized.  Bryant is attempting to assume complete financial control over patient's finances and rental income, and he along with patient's daughter are trying to make arrangements to move him into a nursing home on the [place], which the patient absolutely refuses to do, and none of this was being communicated to Behzad or Mitzy, who were just as surprised as the patient was upon finding this out.  Over the past several days patient has been eating better, and keeping up with his hygiene with minimal prompting.  He is bathing more regularly and Mitzy is helping him shave, which she used to do for her own father when she was caring for him for his dementia.  Mitzy also notes that a few days previously the woman who had been financially exploiting him came to visit with her daughter, and after they had asked her to give them some privacy, Mitzy later caught the daughter rummaging through drawers and cabinets and when asked what she was looking for, she played it off innocuously but suspiciously ended her search.    3/19: Pt reports no complaints.  Mitzy, who has been living with pt along with his son Behzad report increasing agitation and hallucinations.  He has been seeing and hearing people who aren't there; talking to his  mother, and even watching full sports games in his backyard from 4:30-5 in the evening until dusk.  He's able to  vividly describe the jerseys and pants the players are wearing, and keeps score (?!) throughout the game.  Darrell was staying with his other son Bryant for a few days, and Mitzy suspects that there is a large degree of inter-observer variability in describing his behavior.  The Ativan prescribed at last visit has been administered to him 2 to 3 times a day and they are already out.  They seemed to notice a considerable improvement in his agitation, but the hallucinations are more frequent and vivid than ever.  He missed his in-person appointment because as they were about to leave to come in, the woman who had been using his credit cards showed up wanting to take him to lunch.  His cards had all been canceled and flagged for fraud, but auto drafts for utilities not associated with his rental properties are still hitting his account, which they have come to realize is this woman's home address.  Mitzy intervened and did not allow patient to go with the woman.  They have discovered that she is a  and patient has been giving her money and letting her use his credit cards and bank account to pay for her bills.  She noticed last week when he went on his regular route to  rent from his rental properties he came home empty-handed when he normally has the rent money.  His daughter, who is also in healthcare, has been tracking his location when he drives through a phone jake, and noticed that he was way off of his usual route, and she went to find him at the bar where this woman works.  She brought him home in his vehicle and her significant other drove her car home.  He was extremely upset about this for awhile but then forgot about it.  She had his cell phone forwarded to hers, and has noted that this woman calls all through the night.  Since he started hallucinating Mitzy has hidden his keys, and he is frequently frustrated about not being able to find them.  She has also been able to reduce his coffee  consumption by keeping his coffee can nearly empty, so he rations his coffee and drinks fewer cups.  She has also been mixing the coffee with decaf so that he is not getting so much caffeine.  His usual daily routine is getting up about 530 or 6 to use the bathroom, getting up for good about 7 or so; has lunch between 12-1 and then takes a 90 minute nap, after which she notes his mental status seems to fall off a jonelle, with much more irritability.  They have dinner between 6-7 and he will often watch TV until falling asleep at about 9 or so.  Both Mitzy and Behzad note significant improvement in the urinary frequency since starting Flomax.  He is only having to get up once overnight to urinate.  She also reports that he will often refuse food when presented with it, but if it is set in front of him regardless and they walk off without confrontation he will eat it.  It has been 13 days since they have gotten him to bathe, and his hygiene continues to be a difficult issue.  He refuses help with this and denies any difficulty with ADLs.  They have been contacted by audiology and ENT to schedule their visit, but they have yet to hear from Neuropsychology.    Virtual visit inadvertently disconnected due to signal loss; video call had frequent technical issues such as freezes and temporary loss of audio.  Patient's representative unable to reconnect to virtual session.    4Ms for Medical Decision-Making in Older Adults    Last Completed EAWV: None    MOBILITY:  Get Up and Go:       No data to display              Activities of Daily Living:       No data to display              Whisper Test:       No data to display              Disability Status:       No data to display              Nutrition Screening:       No data to display             Screening Score: 0-7 Malnourished, 8-11 At Risk, 12-14 Normal    MENTATION:   Depression Patient Health Questionnaire:       No data to display              Has Dementia Dx:  Yes    Cognitive Function Screening:       No data to display              Cognitive Function Screening Total - Less than 4 = Abnormal,  Greater than or equal to 4 = Normal    MEDICATIONS:  High Risk Medications:  Total Active Medications: 0  This patient does not have an active medication from one of the medication groupers.    WHAT MATTERS MOST:  Advance Care Planning   ACP Status:   Patient has had an ACP conversation  Living Will: No  Power of : No  [place]: No          Social History     Socioeconomic History    Marital status:    Tobacco Use    Smoking status: Never     Passive exposure: Never    Smokeless tobacco: Never   Substance and Sexual Activity    Alcohol use: Yes     Alcohol/week: 3.0 standard drinks of alcohol     Types: 3 Cans of beer per week    Drug use: Not Currently    Sexual activity: Yes     Partners: Female     Social Determinants of Health     Financial Resource Strain: Low Risk  (4/3/2024)    Overall Financial Resource Strain (CARDIA)     Difficulty of Paying Living Expenses: Not hard at all   Food Insecurity: No Food Insecurity (4/3/2024)    Hunger Vital Sign     Worried About Running Out of Food in the Last Year: Never true     Ran Out of Food in the Last Year: Never true   Transportation Needs: No Transportation Needs (4/3/2024)    PRAPARE - Transportation     Lack of Transportation (Medical): No     Lack of Transportation (Non-Medical): No   Physical Activity: Inactive (4/3/2024)    Exercise Vital Sign     Days of Exercise per Week: 0 days     Minutes of Exercise per Session: 0 min   Stress: Stress Concern Present (4/3/2024)    [employer] [place] [employer] - Occupational Stress Questionnaire     Feeling of Stress : Very much   Housing Stability: Low Risk  (4/3/2024)    Housing Stability Vital Sign     Unable to Pay for Housing in the Last Year: No     Number of Places Lived in the Last Year: 1     Unstable Housing in the Last Year: No       No past surgical  history on file.    Past Medical History:   Diagnosis Date    Hypertension 01/16/2024    Moderate late onset Alzheimer's dementia with agitation 07/20/2023       Review of Systems   Unable to perform ROS: Dementia   Constitutional:  Positive for activity change. Negative for unexpected weight change.   HENT:  Positive for rhinorrhea. Negative for hearing loss and trouble swallowing.    Eyes:  Negative for discharge and visual disturbance.   Respiratory:  Negative for chest tightness and wheezing.    Cardiovascular:  Negative for chest pain and palpitations.   Gastrointestinal:  Positive for diarrhea. Negative for blood in stool, constipation and vomiting.   Endocrine: Negative for polydipsia and polyuria.   Genitourinary:  Positive for urgency. Negative for difficulty urinating and hematuria.   Musculoskeletal:  Negative for arthralgias, joint swelling and neck pain.   Neurological:  Negative for weakness and headaches.   Psychiatric/Behavioral:  Positive for agitation, behavioral problems and confusion. Negative for dysphoric mood, hallucinations and sleep disturbance.        Objective:   There were no vitals taken for this visit.    Physical Exam  Constitutional:       General: He is not in acute distress.     Appearance: Normal appearance.      Comments: Pt seen via virtual visit.  He is much more interactive today, and participates in conversation more easily than before.  He also does not get annoyed at everything Mitzy says or asks him.   Neurological:      Mental Status: He is alert.       Lab Results   Component Value Date    WBC 9.8 01/14/2024    HGB 14.0 01/14/2024    HCT 43.1 01/14/2024    TSH 0.57 01/13/2024         Assessment:   78 y.o. male with multiple co-morbid illnesses seen virtually for follow-up.     Plan:     Problem List Items Addressed This Visit    None          Health Maintenance         Date Due Completion Date    Hepatitis C Screening Never done ---    Lipid Panel Never done ---     TETANUS VACCINE Never done ---    Shingles Vaccine (1 of 2) Never done ---    RSV Vaccine (Age 60+ and Pregnant patients) (1 - 1-dose 60+ series) Never done ---    Pneumococcal Vaccines (Age 65+) (1 of 1 - PCV) Never done ---    COVID-19 Vaccine (3 - 2023-24 season) 09/01/2023 4/2/2021    Influenza Vaccine (Season Ended) 09/01/2024 ---            No follow-ups on file. .    Rosendo Brown MD   [facility], [facility], and [employer]    This note was partially dictated using voice recognition software and although actively proofread during transcription, it is possible some errors in transcription may have been overlooked.    Answers submitted by the patient for this visit:  Review of Systems Questionnaire (Submitted on 4/4/2024)  activity change: Yes  unexpected weight change: No  neck pain: No  hearing loss: No  rhinorrhea: Yes  trouble swallowing: No  eye discharge: No  visual disturbance: No  chest tightness: No  wheezing: No  chest pain: No  palpitations: No  blood in stool: No  constipation: No  vomiting: No  diarrhea: Yes  polydipsia: No  polyuria: No  difficulty urinating: No  urgency: Yes  hematuria: No  joint swelling: No  arthralgias: No  headaches: No  weakness: No  confusion: Yes  dysphoric mood: No

## 2024-05-16 ENCOUNTER — OUTPATIENT CASE MANAGEMENT (OUTPATIENT)
Dept: NEUROLOGY | Facility: CLINIC | Age: 79
End: 2024-05-16
Payer: MEDICARE

## 2024-05-23 ENCOUNTER — OUTPATIENT CASE MANAGEMENT (OUTPATIENT)
Dept: NEUROLOGY | Facility: CLINIC | Age: 79
End: 2024-05-23
Payer: MEDICARE

## 2024-05-23 NOTE — PROGRESS NOTES
Social Work - Dementia Care Management:    After unsuccessful attempts to send resources via email to three different addresses, sent the following to marta Wen via [employer]:  [place] for Dementia Caregiver Support Group via [employer]  [facility] for in-person Dementia Caregiver Support Group at main campus  Links and info for six area Adult Day Programs  Two general Tip Sheets  Home Safety Checklist   Technology and Product Guide  Website for Alzheimer's Association site  Website for Family Caregiver [place] website  Website for [email or web address] videos and podcast  Books recs and offers of complimentary copy

## 2024-05-29 ENCOUNTER — OUTPATIENT CASE MANAGEMENT (OUTPATIENT)
Dept: NEUROLOGY | Facility: CLINIC | Age: 79
End: 2024-05-29
Payer: MEDICARE

## 2024-05-29 NOTE — PROGRESS NOTES
Social Work - Dementia Care Management:    Voice mail received from caregiver, son's  Mitzy.  Returned call today. She provided update on recent events, with fmly members till in disagreement over how to best proceed with pt's care and the amount of supervision he needs versus what he can do independently.  Due to provider's limited available time, Mitzy stated she will email updates as needed.

## 2024-06-05 ENCOUNTER — OUTPATIENT CASE MANAGEMENT (OUTPATIENT)
Dept: NEUROLOGY | Facility: CLINIC | Age: 79
End: 2024-06-05
Payer: MEDICARE

## 2024-06-05 NOTE — PROGRESS NOTES
Social Work - Dementia Care Management:    Phone consultation with caregiver, son's  Mitzy. She provided update on recent family conflict. Provided support and general guidance.

## 2024-06-10 ENCOUNTER — OUTPATIENT CASE MANAGEMENT (OUTPATIENT)
Dept: NEUROLOGY | Facility: CLINIC | Age: 79
End: 2024-06-10
Payer: MEDICARE

## 2024-06-10 NOTE — PROGRESS NOTES
Social Work - Dementia Care Management:    Phoned caregivers, son Behzad and his partner Mitzy, in response to voice mails and emails received from them over past few days. Family situation continues to be contentious.  I have received an emailed photo from Mitzy of Revocation of Power of , and she stated she will provide original in person for scanning into chart.  Provided support and resource information to caregivers, and encouraged them to enlist and follow guidance of an .

## 2024-06-19 ENCOUNTER — OUTPATIENT CASE MANAGEMENT (OUTPATIENT)
Dept: NEUROLOGY | Facility: CLINIC | Age: 79
End: 2024-06-19
Payer: MEDICARE

## 2024-06-19 NOTE — PROGRESS NOTES
Social Work - Dementia Care Management:    Spoke on phone with Mitzy today.   She reported continued concerns regarding pt's status and the conflict with family.   Provided guidance and support, encouraged consultation with .

## 2024-06-21 ENCOUNTER — OUTPATIENT CASE MANAGEMENT (OUTPATIENT)
Dept: NEUROLOGY | Facility: CLINIC | Age: 79
End: 2024-06-21
Payer: MEDICARE

## 2024-06-21 NOTE — PROGRESS NOTES
Social Work - Dementia Care Management:    Voice mail received 6/20/24 from caregiver Mitzy.  Email reply sent today with guidance, and general resources and Tip Sheets.  In light of ongoing family conflict, content of Social Work notes has been abridged due to pt's chart being accessed be a family member.

## 2024-06-28 ENCOUNTER — OUTPATIENT CASE MANAGEMENT (OUTPATIENT)
Dept: NEUROLOGY | Facility: CLINIC | Age: 79
End: 2024-06-28
Payer: MEDICARE

## 2024-06-28 NOTE — PROGRESS NOTES
Social Work - Dementia Care Management:    Received voice mails from pt's caregiver Mitzy yesterday afternoon and this morning, along with emails.   Due to on-going family conflict and a family member continuing to access this chart, the content of those messages is not being described here.    ADDENDUM:  Returned call to Mitzy; unable to leave voice mail. Sent email reply with further guidance.

## 2024-07-01 ENCOUNTER — TELEPHONE (OUTPATIENT)
Facility: CLINIC | Age: 79
End: 2024-07-01
Payer: MEDICARE

## 2024-07-01 NOTE — TELEPHONE ENCOUNTER
Received message requesting callback from late Friday afternoon.  Attempted to call back to address concerns; no answer and voicemail not set up so unable to leave message.  Will try again.    I was able to reach her upon second attempt in the afternoon and we spoke for 1.5 hours regarding pt's current situation.  Since the privacy of the patient's chart is compromised the details of that conversation will not be documented here for his safety in context of pending legal action.

## 2024-07-02 ENCOUNTER — OUTPATIENT CASE MANAGEMENT (OUTPATIENT)
Dept: NEUROLOGY | Facility: CLINIC | Age: 79
End: 2024-07-02
Payer: MEDICARE

## 2024-07-02 NOTE — PROGRESS NOTES
Social Work - Dementia Care Management:    Received voice mails and emails from caregiver Mitzy over past couple days requesting consultation. Spoke on phone with Mitzy today. Provided guidance and support.  Due to highly conflictual family situation, while a family member who is a physician is accessing the chart, the content of these communications is not being described here.

## 2024-07-03 ENCOUNTER — DOCUMENTATION ONLY (OUTPATIENT)
Facility: CLINIC | Age: 79
End: 2024-07-03
Payer: MEDICARE

## 2024-07-03 NOTE — PROGRESS NOTES
This is to serve as notice that I revoke limitation of decision-making capacity as described in my letters dated 3/21/2024 and 3/27/2024 in light of patient's improvement in cognitive functioning with the behavioral management strategies described in my progress notes, such that he was able to meet legal standards of competency necessary to issue a revocation of Power of  as notarized on 5/9/2024.

## 2024-07-12 ENCOUNTER — OUTPATIENT CASE MANAGEMENT (OUTPATIENT)
Dept: NEUROLOGY | Facility: CLINIC | Age: 79
End: 2024-07-12
Payer: MEDICARE

## 2024-07-12 NOTE — PROGRESS NOTES
Social Work - Dementia Care Management:    Phone consultation with caregiver Mitzy.  Provided support.  Call was terminated while Mitzy was speaking due to loss of internet connection. Sent email to Mitzy notifying of tech issue, asking her to update me via email next week.  Due to highly conflictual family situation, while a family member who is a physician is accessing the chart, the content of these communications is not being described here.

## 2024-08-22 ENCOUNTER — OUTPATIENT CASE MANAGEMENT (OUTPATIENT)
Dept: NEUROLOGY | Facility: CLINIC | Age: 79
End: 2024-08-22
Payer: MEDICARE

## 2024-08-22 NOTE — PROGRESS NOTES
Social Work - Dementia Care Management:    Email exchanges over past week with pt's former caregiver and friend of son Behzad, Mitzy. She has ongoing concerns about pt being care for by other [facility] members, and she has tried without success to take legal actions to return pt to his home with she and Behzad caring for him. Mitzy asking for further advise and phone consultation; offered 8/27/24, 3:00 PM; await confirmation.

## 2024-08-27 ENCOUNTER — OUTPATIENT CASE MANAGEMENT (OUTPATIENT)
Dept: NEUROLOGY | Facility: CLINIC | Age: 79
End: 2024-08-27
Payer: MEDICARE

## 2024-08-27 NOTE — PROGRESS NOTES
Social Work - Dementia Care Management:    Phone consultation with previous caregiver, Mitzy (friend of son Behzad).  She reported update of pt's status, continued concerns that pt's wishes are not being honored and that his daughter is not providing proper care  Mitzy has had multiple communications with Elderly Protective Services, who have investigated, and has also addressed concerns through court proceedings  Advised Mitzy to continue to report to EPS as new information becomes available; however, she described several instances of second- and third-hand information, and I advised her to request of parties who have first-hand knowledge to call in the information themselves  Mitzy plans to move out of town, but is prepared to care for pt in her home if the courts allowed

## 2025-01-27 NOTE — PATIENT INSTRUCTIONS
Let me know when the next meeting of the children with the  will be and I will join in.    
[Time Spent: ___ minutes] : I have spent [unfilled] minutes of time on the encounter which excludes teaching and separately reported services.

## 2025-05-08 ENCOUNTER — PATIENT OUTREACH (OUTPATIENT)
Dept: NEUROLOGY | Facility: CLINIC | Age: 80
End: 2025-05-08
Payer: MEDICARE